# Patient Record
Sex: MALE | Race: WHITE | NOT HISPANIC OR LATINO | Employment: FULL TIME | URBAN - METROPOLITAN AREA
[De-identification: names, ages, dates, MRNs, and addresses within clinical notes are randomized per-mention and may not be internally consistent; named-entity substitution may affect disease eponyms.]

---

## 2018-07-17 ENCOUNTER — HOSPITAL ENCOUNTER (EMERGENCY)
Facility: HOSPITAL | Age: 53
Discharge: HOME | End: 2018-07-17
Attending: EMERGENCY MEDICINE
Payer: COMMERCIAL

## 2018-07-17 VITALS
RESPIRATION RATE: 18 BRPM | BODY MASS INDEX: 25.9 KG/M2 | SYSTOLIC BLOOD PRESSURE: 134 MMHG | HEART RATE: 89 BPM | DIASTOLIC BLOOD PRESSURE: 73 MMHG | WEIGHT: 185 LBS | OXYGEN SATURATION: 98 % | TEMPERATURE: 98 F | HEIGHT: 71 IN

## 2018-07-17 DIAGNOSIS — L98.9 SKIN LESION OF CHEST WALL: Primary | ICD-10-CM

## 2018-07-17 PROCEDURE — 99283 EMERGENCY DEPT VISIT LOW MDM: CPT

## 2018-07-17 PROCEDURE — 25000003 PHARM REV CODE 250: Performed by: EMERGENCY MEDICINE

## 2018-07-17 RX ORDER — KETOROLAC TROMETHAMINE 10 MG/1
10 TABLET, FILM COATED ORAL
Status: COMPLETED | OUTPATIENT
Start: 2018-07-17 | End: 2018-07-17

## 2018-07-17 RX ORDER — DIPHENHYDRAMINE HCL 25 MG
25 CAPSULE ORAL
Status: COMPLETED | OUTPATIENT
Start: 2018-07-17 | End: 2018-07-17

## 2018-07-17 RX ORDER — KETOROLAC TROMETHAMINE 10 MG/1
10 TABLET, FILM COATED ORAL EVERY 6 HOURS
Qty: 20 TABLET | Refills: 0 | Status: SHIPPED | OUTPATIENT
Start: 2018-07-17

## 2018-07-17 RX ORDER — MUPIROCIN 20 MG/G
1 OINTMENT TOPICAL
Status: COMPLETED | OUTPATIENT
Start: 2018-07-17 | End: 2018-07-17

## 2018-07-17 RX ADMIN — KETOROLAC TROMETHAMINE 10 MG: 10 TABLET, FILM COATED ORAL at 12:07

## 2018-07-17 RX ADMIN — MUPIROCIN 22 G: 20 OINTMENT TOPICAL at 12:07

## 2018-07-17 RX ADMIN — DIPHENHYDRAMINE HYDROCHLORIDE 25 MG: 25 CAPSULE ORAL at 12:07

## 2018-07-17 NOTE — ED NOTES
Neuro: AAOx3  Resp: Airway patent, respirations even/unlabored  Cardiac: Skin pink/warm/dry, pulses intact  Abdomen: Soft, non-tender to palpation, denies N/V/D  Musculoskeletal: Moves all extremities equally, ROM intact  Dime sized area of irritation near center of pts chest

## 2018-07-17 NOTE — DISCHARGE INSTRUCTIONS
Apply the antibiotic ointment provided twice Per day.  You may take Benadryl as needed for itching.  Follow up with your surgeon as planned next week

## 2018-07-17 NOTE — ED PROVIDER NOTES
Encounter Date: 7/17/2018       History     Chief Complaint   Patient presents with    Rash     pt c/o rash to chest that has been burning x 5 months but pain has gotten worse. pt reports he was diagnosed with skin cancer in 2004.      Chief complaint:  Rash  53-year-old notes an area of erythema to his upper chest.  This has been ongoing for the last 5 months.  Patient is from [place] and was supposed to have a biopsy done by his surgeon but it had to be postponed.  He does have an appointment next week.  Patient has a history of melanoma at the same spot.  This occurred 10 years ago.  He notes itching and burning pain around the site.  He has not noted any other lesions on his body.  Patient takes aspirin for the discomfort.  His pain is severe at times.      The history is provided by the patient.     Review of patient's allergies indicates:   Allergen Reactions    Erythromycin     Keflex [cephalexin]      Past Medical History:   Diagnosis Date    Anxiety     MRSA (methicillin resistant Staphylococcus aureus)     Skin cancer 2004     Past Surgical History:   Procedure Laterality Date    WRIST SURGERY       History reviewed. No pertinent family history.  Social History   Substance Use Topics    Smoking status: Current Every Day Smoker    Smokeless tobacco: Not on file    Alcohol use Not on file     Review of Systems   Constitutional: Negative for fever.   Skin: Positive for color change and rash.       Physical Exam     Initial Vitals [07/17/18 1224]   BP Pulse Resp Temp SpO2   134/73 89 18 97.7 °F (36.5 °C) 98 %      MAP       --         Physical Exam    Nursing note and vitals reviewed.  Constitutional: No distress.   Neck: Neck supple.   Pulmonary/Chest: No respiratory distress.       Neurological: He is alert and oriented to person, place, and time.   Skin: Skin is warm and dry. There is erythema.   Psychiatric: He has a normal mood and affect.         ED Course   Procedures  Labs Reviewed - No data  to display       Imaging Results    None          Medical Decision Making:   Initial Assessment:   53-year-old presents with a lesion to his chest wall that has been present for 5 months but is getting worse.  On exam patient does have a small raised erythematous area at the surgical site of his previous melanoma  ED Management:  Patient declined referral to Dermatology since he has an appointment with his surgeon next week for biopsy (in [place]).  Secondary to the mild erythema patient will be treated with Bactroban.  He will also be given Toradol and Benadryl.  He will be discharged on Toradol and Bactroban.  He says he absolutely will follow up as planned                      Clinical Impression:   The encounter diagnosis was Skin lesion of chest wall.                             Linnette Maria MD  07/17/18 3859

## 2018-08-07 ENCOUNTER — HOSPITAL ENCOUNTER (EMERGENCY)
Facility: HOSPITAL | Age: 53
Discharge: HOME | End: 2018-08-07
Attending: EMERGENCY MEDICINE
Payer: COMMERCIAL

## 2018-08-07 VITALS
OXYGEN SATURATION: 97 % | HEIGHT: 71 IN | TEMPERATURE: 98 F | HEART RATE: 77 BPM | RESPIRATION RATE: 18 BRPM | DIASTOLIC BLOOD PRESSURE: 55 MMHG | BODY MASS INDEX: 25.76 KG/M2 | SYSTOLIC BLOOD PRESSURE: 152 MMHG | WEIGHT: 184 LBS

## 2018-08-07 DIAGNOSIS — Z48.02 VISIT FOR SUTURE REMOVAL: Primary | ICD-10-CM

## 2018-08-07 PROCEDURE — 99281 EMR DPT VST MAYX REQ PHY/QHP: CPT

## 2018-08-07 NOTE — ED PROVIDER NOTES
Encounter Date: 8/7/2018       History     Chief Complaint   Patient presents with    Suture / Staple Removal     pt had skin lesion removed on chest by MD 10 days ago and is here to have them taken out     Chief complaint:  I need my stitches out  53-year-old request suture removal.  Patient had a biopsy to his chest done in [place] on July 26.  He has possible melanoma.  Patient denies complaints. No fever.      The history is provided by the patient and medical records.     Review of patient's allergies indicates:   Allergen Reactions    Erythromycin     Keflex [cephalexin]      Past Medical History:   Diagnosis Date    Anxiety     MRSA (methicillin resistant Staphylococcus aureus)     Skin cancer 2004     Past Surgical History:   Procedure Laterality Date    WRIST SURGERY       History reviewed. No pertinent family history.  Social History   Substance Use Topics    Smoking status: Current Every Day Smoker    Smokeless tobacco: Not on file    Alcohol use Not on file     Review of Systems   Constitutional: Negative for fever.   Skin: Positive for wound. Negative for color change.       Physical Exam     Initial Vitals [08/07/18 0810]   BP Pulse Resp Temp SpO2   (!) 152/55 77 18 98.1 °F (36.7 °C) 97 %      MAP       --         Physical Exam    Nursing note and vitals reviewed.  Constitutional: No distress.   HENT:   Head: Atraumatic.   Eyes: Conjunctivae are normal.   Pulmonary/Chest: No respiratory distress.       Neurological: He is alert and oriented to person, place, and time.   Psychiatric: He has a normal mood and affect.         ED Course   Suture Removal  Date/Time: 8/7/2018 8:37 AM  Performed by: PHILLIP CAO  Authorized by: STALIN KU   Body area: trunk  Location details: chest  Wound Appearance: clean, well healed, normal color, nontender and no drainage  Sutures Removed: 8        Labs Reviewed - No data to display       Imaging Results    None          Medical Decision Making:    Initial Assessment:   53-year-old request suture removal.  Patient's wound appears clean, dry, intact  ED Management:  Wound appears to be healing well.  Sutures removed by the nurse                      Clinical Impression:   The encounter diagnosis was Visit for suture removal.                             Linnette Maria MD  08/07/18 0897